# Patient Record
Sex: FEMALE | Race: WHITE | NOT HISPANIC OR LATINO | Employment: STUDENT | URBAN - METROPOLITAN AREA
[De-identification: names, ages, dates, MRNs, and addresses within clinical notes are randomized per-mention and may not be internally consistent; named-entity substitution may affect disease eponyms.]

---

## 2017-01-26 ENCOUNTER — ALLSCRIPTS OFFICE VISIT (OUTPATIENT)
Dept: OTHER | Facility: OTHER | Age: 27
End: 2017-01-26

## 2017-01-26 DIAGNOSIS — K58.9 IRRITABLE BOWEL SYNDROME WITHOUT DIARRHEA: ICD-10-CM

## 2017-04-20 ENCOUNTER — ALLSCRIPTS OFFICE VISIT (OUTPATIENT)
Dept: OTHER | Facility: OTHER | Age: 27
End: 2017-04-20

## 2017-05-01 ENCOUNTER — TRANSCRIBE ORDERS (OUTPATIENT)
Dept: LAB | Facility: CLINIC | Age: 27
End: 2017-05-01

## 2017-05-01 ENCOUNTER — APPOINTMENT (OUTPATIENT)
Dept: LAB | Facility: CLINIC | Age: 27
End: 2017-05-01
Payer: COMMERCIAL

## 2017-05-01 DIAGNOSIS — K58.9 IRRITABLE BOWEL SYNDROME WITHOUT DIARRHEA: ICD-10-CM

## 2017-05-01 PROCEDURE — 87338 HPYLORI STOOL AG IA: CPT

## 2017-05-02 ENCOUNTER — GENERIC CONVERSION - ENCOUNTER (OUTPATIENT)
Dept: OTHER | Facility: OTHER | Age: 27
End: 2017-05-02

## 2017-05-02 LAB — H PYLORI AG STL QL IA: NEGATIVE

## 2017-06-08 ENCOUNTER — GENERIC CONVERSION - ENCOUNTER (OUTPATIENT)
Dept: OTHER | Facility: OTHER | Age: 27
End: 2017-06-08

## 2017-06-09 ENCOUNTER — ALLSCRIPTS OFFICE VISIT (OUTPATIENT)
Dept: OTHER | Facility: OTHER | Age: 27
End: 2017-06-09

## 2017-09-18 ENCOUNTER — HOSPITAL ENCOUNTER (OUTPATIENT)
Dept: GASTROENTEROLOGY | Facility: HOSPITAL | Age: 27
Discharge: HOME | End: 2017-09-18
Payer: COMMERCIAL

## 2017-09-18 VITALS
WEIGHT: 100 LBS | BODY MASS INDEX: 19.63 KG/M2 | DIASTOLIC BLOOD PRESSURE: 72 MMHG | HEART RATE: 85 BPM | HEIGHT: 60 IN | SYSTOLIC BLOOD PRESSURE: 118 MMHG | OXYGEN SATURATION: 100 % | RESPIRATION RATE: 16 BRPM | TEMPERATURE: 98.2 F

## 2017-09-18 PROCEDURE — 91122 HB ANAL PRESSURE RECORD: CPT

## 2017-10-22 ENCOUNTER — LAB CONVERSION - ENCOUNTER (OUTPATIENT)
Dept: OTHER | Facility: OTHER | Age: 27
End: 2017-10-22

## 2017-10-22 LAB
CHOLEST SERPL-MCNC: 221 MG/DL
CHOLEST/HDLC SERPL: 3.5 (CALC)
HDLC SERPL-MCNC: 64 MG/DL
LDL CHOLESTEROL (HISTORICAL): 135 MG/DL (CALC)
NON-HDL-CHOL (CHOL-HDL) (HISTORICAL): 157 MG/DL (CALC)
TRIGL SERPL-MCNC: 110 MG/DL

## 2017-11-27 ENCOUNTER — GENERIC CONVERSION - ENCOUNTER (OUTPATIENT)
Dept: OTHER | Facility: OTHER | Age: 27
End: 2017-11-27

## 2018-01-09 NOTE — RESULT NOTES
Verified Results  (1) Keo Rico, STOOL [id number] 11:15AM Eloy Seed Order Number: [id number]     Test Name Result Flag Reference   H PYLORI ANTIGEN Negative  Negative   Performed at:  [address]  [id number]  : Kathleen Davis MD, Phone:  [phone]

## 2018-01-11 NOTE — PROGRESS NOTES
Assessment    1  Irritable bowel syndrome (564 1) (K58 9)    Plan  Irritable bowel syndrome    · Gastroenterology Referral Other Physician Referral  Consult  Status: Active  Requested  for: [id number]  are Referring to a non- Preferred Provider : Provider not listed in [employer]  Care Summary provided  : Yes    Discussion/Summary    #1  Irritable bowel syndrome- uncontrolled on current regimen with mucus discharge from the rectum  Refer to GI for further evaluation  Patient is living in [place] and will seek out a GI specialist in her area  General referral to gastroenterologist was given  Continue Bentyl until seen  Chief Complaint  c/o issues with IBS  Pt  reports having "anal drainage"  kck      History of Present Illness  HPI: Patient here today because she is having difficulty managing her irritable bowel syndrome-she states she has been having more mucus the discharge from her rectal area similar to a vaginal discharge  She states that she's been having this in her underwear and when she thinks she has gas she is actually passing this mucus and she also has woke up the next morning after sleeping with her boyfriend finding this discharge in the sheets as well and she is very embarrassed by this and wants to see what she can do to stop it  She is taking Bentyl on a regular basis overall does seem to calm down and control her major aerobic bowel syndrome symptoms such as a cramping in the diarrhea  Sometimes she says she needs to stopped taking it because she becomes constipated  She does notice that her symptoms of mucousy discharge and sometimes blood to increase after drinking alcohol  She is overall concerned about anything that she takes affecting the strength for birth control because she does not want to get pregnant  She states that she always uses a condom  She's never seen a gastroenterologist and has been [place] at this time has 2 jobs and is going to [employer]   No family history of any colon issues alternate colitis or Crohn's disease within her family  Last year we did test her for celiac and other blood work which was all normal       Active Problems    1  Acute reaction to stress (308 9) (F43 0)   2  Allergic rhinitis (477 9) (J30 9)   3  Asthma (493 90) (J45 909)   4  Bronchitis (490) (J40)   5  Contraceptives (V25 02)   6  Cough (786 2) (R05)   7  Fatigue (780 79) (R53 83)   8  Human papilloma virus infection (079 4) (B97 7)   9  Hypertriglyceridemia (272 1) (E78 1)   10  Irritable bowel syndrome (564 1) (K58 9)   11  Reactive airway disease (493 90) (J45 909)   12  Sinusitis (473 9) (J32 9)    Family History    1  Family history of Osteopenia    2  Family history of Atrial Fibrillation    3  Family history of Depression    4  Family history of Alzheimer Disease    5  Family history of Malignant Melanoma Of The Skin (V16 8)    Social History    · Being A Social Drinker   · Never A Smoker    Surgical History    1  Contraceptives (V25 02)    Current Meds   1  Dicyclomine HCl - 10 MG Oral Capsule; Patient to take one tablet with each meal and   another before bed as needed for her symptoms of diarrhea and cramping; Therapy: 21NLV7500 to (Last Rx:09Sep2015)  Requested for: 09Sep2015 Ordered   2  Lo Loestrin Fe 1 MG-10 MCG / 10 MCG Oral Tablet; Therapy: 58CPH9380 to (Evaluate:08Cus2096) Recorded    Allergies    1  No Known Drug Allergies    Vitals   Recorded: 89VAD0328 03:21PM Recorded: 88YKM5482 03:17PM   Heart Rate 60    Systolic 809, LUE, Sitting    Diastolic 60, LUE, Sitting    Height  4 ft 11 5 in   Weight  104 lb 4 00 oz   BMI Calculated  20 7   BSA Calculated  1 41     Physical Exam    Constitutional   General appearance: No acute distress, well appearing and well nourished  Eyes   Conjunctiva and lids: No swelling, erythema or discharge      Ears, Nose, Mouth, and Throat   External inspection of ears and nose: Normal     Pulmonary   Respiratory effort: No increased work of breathing or signs of respiratory distress  Auscultation of lungs: Clear to auscultation  Cardiovascular   Auscultation of heart: Normal rate and rhythm, normal S1 and S2, without murmurs  Abdomen   Abdomen: Non-tender, no masses  Liver and spleen: No hepatomegaly or splenomegaly      Musculoskeletal   Gait and station: Normal     Psychiatric   Mood and affect: Normal          Signatures   Electronically signed by : Angelica Isaac [facility]; Jan 19 2016  4:49PM EST                       (Author)    Electronically signed by : JESI Flores ; Jan 19 2016  8:08PM EST

## 2018-01-11 NOTE — PROGRESS NOTES
Assessment    1  Never a smoker   2  Encounter for preventive health examination (V70 0) (Z00 00)    Discussion/Summary    #1  Gen  physical-form completed  Pt had PPD in recent past but does not have date or results with her  PPD not repeated  #2  L foot onychomycosis-patient encouraged to change socks and shoes after running and rotate between more than one    Running shoes  If nails do not improve I would suggest seeing podiatry  Chief Complaint  Pt  here for annual physical exam  Pt  has forms to be completed for employment  c/o "toe infection" on left foot  kck      History of Present Illness  HM, Adult Female: The patient is being seen for a health maintenance evaluation  General Health:   Lifestyle:  She does not have a healthy diet  She does not have any weight concerns  She exercises regularly  She does not use tobacco  She denies drug use  Reproductive health: the patient is premenopausal    Screening:   HPI: Patient here today for general physical  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 and now they want higher her for the remainder of the school year and so a form as needed  Form does require PPD however patient states she has recently had one but does not have the date with results and will gather this information to submit with the form  She is complaining of thickening and breaking of her toenails in the left side  She does run on a regular basis and did get her shoes from the running in 4  Review of Systems    Constitutional: No fever, no chills, feels well, no tiredness, no recent weight gain or weight loss  Eyes: No complaints of eye pain, no red eyes, no eyesight problems, no discharge, no dry eyes, no itching of eyes  ENT: no complaints of earache, no loss of hearing, no nose bleeds, no nasal discharge, no sore throat, no hoarseness     Cardiovascular: No complaints of slow heart rate, no fast heart rate, no chest pain, no palpitations, no leg claudication, no lower extremity edema  Respiratory: No complaints of shortness of breath, no wheezing, no cough, no SOB on exertion, no orthopnea, no PND  Gastrointestinal: No complaints of abdominal pain, no constipation, no nausea or vomiting, no diarrhea, no bloody stools  Genitourinary: No complaints of dysuria, no incontinence, no pelvic pain, no dysmenorrhea, no vaginal discharge or bleeding  Musculoskeletal: No complaints of arthralgias, no myalgias, no joint swelling or stiffness, no limb pain or swelling  Integumentary: as noted in HPI  Neurological: No complaints of headache, no confusion, no convulsions, no numbness, no dizziness or fainting, no tingling, no limb weakness, no difficulty walking  Psychiatric: Not suicidal, no sleep disturbance, no anxiety or depression, no change in personality, no emotional problems  Endocrine: No complaints of proptosis, no hot flashes, no muscle weakness, no deepening of the voice, no feelings of weakness  Hematologic/Lymphatic: No complaints of swollen glands, no swollen glands in the neck, does not bleed easily, does not bruise easily  ROS reviewed  Active Problems    1  Acute reaction to stress (308 9) (F43 0)   2  Allergic rhinitis (477 9) (J30 9)   3  Asthma (493 90) (J45 909)   4  Bronchitis (490) (J40)   5  Contraceptives (V25 02)   6  Cough (786 2) (R05)   7  Fatigue (780 79) (R53 83)   8  Human papilloma virus infection (079 4) (B97 7)   9  Hyperlipidemia (272 4) (E78 5)   10  Hypertriglyceridemia (272 1) (E78 1)   11  Irritable bowel syndrome (564 1) (K58 9)   12  Reactive airway disease (493 90) (J45 909)   13   Sinusitis (473 9) (J32 9)    Past Medical History    · Denied: History of mental disorder    Surgical History    · Contraceptives (V25 02)    Family History  Mother    · Family history of Osteopenia  Father    · Family history of Atrial Fibrillation  Brother    · Family history of Depression  Maternal Grandmother    · Family history of Alzheimer Disease  Paternal Grandfather    · Family history of Malignant Melanoma Of The Skin (V16 8)  Family History    · No family history of mental disorder    Social History    · Being A Social Drinker   · Never a smoker   · No secondhand smoke exposure (V49 89) (Z78 9)   · Single   · Student    Current Meds   1  Daily Multivitamin TABS; Therapy: (Leo Ferreira) to Recorded   2  Dandelion Root CAPS; Therapy: (Leo Ferreira) to Recorded   3  Digestive Enzymes Oral Tablet; Therapy: (Leo Ferreira) to Recorded   4  Lo Loestrin Fe 1 MG-10 MCG / 10 MCG Oral Tablet; Therapy: 70EVM2195 to (Evaluate:85Cvs3411) Recorded   5  Saccharomyces boulardii 50 MG CAPS; Therapy: (Leo Ferreira) to Recorded   6  Xifaxan 550 MG Oral Tablet; TAKE 1 TABLET 3 times daily; Therapy: 93VJP4473 to (Rafael Castro)  Requested for: 22BPK2649; Last   Rx:26Jan2017 Ordered    Allergies    1  No Known Drug Allergies    Vitals   Recorded: 20Apr2017 08:03AM   Temperature 98 5 F, Tympanic   Heart Rate 68   Respiration 18   Systolic 271, LUE, Sitting   Diastolic 64, LUE, Sitting   Height 4 ft 11 5 in   Weight 104 lb 6 oz   BMI Calculated 20 73   BSA Calculated 1 41     Physical Exam    Constitutional   General appearance: No acute distress, well appearing and well nourished  Head and Face   Head and face: Normal     Eyes   Conjunctiva and lids: No swelling, erythema or discharge  Pupils and irises: Equal, round, reactive to light  Ears, Nose, Mouth, and Throat   External inspection of ears and nose: Normal     Otoscopic examination: Tympanic membranes translucent with normal light reflex  Canals patent without erythema  Hearing: Normal     Nasal mucosa, septum, and turbinates: Normal without edema or erythema  Lips, teeth, and gums: Normal, good dentition  Oropharynx: Normal with no erythema, edema, exudate or lesions  Neck   Neck: Supple, symmetric, trachea midline, no masses  Thyroid: Normal, no thyromegaly  Pulmonary   Respiratory effort: No increased work of breathing or signs of respiratory distress  Auscultation of lungs: Clear to auscultation  Cardiovascular   Auscultation of heart: Normal rate and rhythm, normal S1 and S2, no murmurs  Carotid pulses: 2+ bilaterally  Examination of extremities for edema and/or varicosities: Normal     Abdomen   Abdomen: Non-tender, no masses  Liver and spleen: No hepatomegaly or splenomegaly  Lymphatic   Palpation of lymph nodes in neck: No lymphadenopathy  Palpation of lymph nodes in axillae: No lymphadenopathy  Musculoskeletal   Gait and station: Normal     Digits and nails: Abnormal   L 2-4th toes nails with yellow/white discoloration, thickening and brittelness  Joints, bones, and muscles: Normal     Muscle strength/tone: Normal     Skin   Skin and subcutaneous tissue: Normal without rashes or lesions  Neurologic   Reflexes: 2+ and symmetric  Psychiatric   Judgment and insight: Normal     Mood and affect: Normal        Future Appointments    Date/Time Provider Specialty Site   04/25/2017 10:00 AM Heavenly Pike DO Gastroenterology Adult [address]     Signatures   Electronically signed by : Ana Combs, [facility]; Apr 20 2017  8:31AM EST                       (Author)    Electronically signed by : Rosi Thompson DO;  Apr 20 2017  8:32AM EST                       (Author)

## 2018-01-12 VITALS
HEART RATE: 104 BPM | BODY MASS INDEX: 20.1 KG/M2 | TEMPERATURE: 97.3 F | HEIGHT: 60 IN | SYSTOLIC BLOOD PRESSURE: 104 MMHG | OXYGEN SATURATION: 94 % | WEIGHT: 102.38 LBS | DIASTOLIC BLOOD PRESSURE: 60 MMHG | RESPIRATION RATE: 18 BRPM

## 2018-01-12 NOTE — RESULT NOTES
Message   Pls call the pt  I tried calling however was left to go to voice mail and the box was full and could not leave a message  Her repeat lipid panel is much improved from her last  Her LDL went down to 138 from 188  Her trigs went from 121 to 157  I believe she has time and means to work on her cholesterol in [facility] next 3-4 months and then to repeat without starting meds at this time  Orders were placed  Verified Results  (1) LIPID PANEL FASTING W DIRECT LDL REFLEX [id number] 08:59AM [facility] Order Number: [id number]     Test Name Result Flag Reference   CHOLESTEROL 235 mg/dL H    LDL CHOLESTEROL CALCULATED 138 mg/dL H 0-100   - Patient Instructions: This is a fasting blood test  Water, black tea or black coffee only after 9:00pm the night before test   Drink 2 glasses of water the morning of test     - Patient Instructions: This is a fasting blood test  Water, black tea or black coffee only after 9:00pm the night before test Drink 2 glasses of water the morning of test   Triglyceride:         Normal              <150 mg/dl       Borderline High    150-199 mg/dl       High               200-499 mg/dl       Very High          >499 mg/dl  Cholesterol:         Desirable        <200 mg/dl      Borderline High  200-239 mg/dl      High             >239 mg/dl  HDL Cholesterol:        High    >59 mg/dL      Low     <41 mg/dL  LDL Cholesterol:        Optimal          <100 mg/dl        Near Optimal     100-129 mg/dl        Above Optimal          Borderline High   130-159 mg/dl          High              160-189 mg/dl          Very High        >189 mg/dl  LDL CALCULATED:    This screening LDL is a calculated result  It does not have the accuracy of the Direct Measured LDL in the monitoring of patients with hyperlipidemia and/or statin therapy  Direct Measure LDL (OAD503) must be ordered separately in these patients     TRIGLYCERIDES 157 mg/dL H <=150   Specimen collection should occur prior to N-Acetylcysteine or Metamizole administration due to the potential for falsely depressed results  HDL,DIRECT 66 mg/dL H 40-60   Specimen collection should occur prior to Metamizole administration due to the potential for falsely depressed results  Plan  Hyperlipidemia    · (1) LIPID PANEL FASTING W DIRECT LDL REFLEX; Status:Active;  Requested  for:44Qek9231;     Signatures   Electronically signed by : Caryn Hernandez, [facility]; Nov 28 2016  1:52PM EST                       (Author)

## 2018-01-14 VITALS
BODY MASS INDEX: 20.03 KG/M2 | HEIGHT: 60 IN | WEIGHT: 102 LBS | TEMPERATURE: 97.1 F | OXYGEN SATURATION: 98 % | HEART RATE: 62 BPM | SYSTOLIC BLOOD PRESSURE: 102 MMHG | DIASTOLIC BLOOD PRESSURE: 60 MMHG

## 2018-01-15 VITALS
WEIGHT: 104.38 LBS | HEART RATE: 68 BPM | RESPIRATION RATE: 18 BRPM | BODY MASS INDEX: 20.49 KG/M2 | DIASTOLIC BLOOD PRESSURE: 64 MMHG | HEIGHT: 60 IN | TEMPERATURE: 98.5 F | SYSTOLIC BLOOD PRESSURE: 100 MMHG

## 2018-01-17 NOTE — MISCELLANEOUS
Provider Comments  Provider Comments:   Dear Dayanna Mena,    You missed your appointment with Dr Homa Gil on 11/27/2017; please contact our office to reschedule at [phone]  We understand that many situations arise that occasionally prevents patients from keeping scheduled appointments  It is the policy of [address] [place] [address] [facility] that patients notify us 24 hours in advance if unable to keep a scheduled appointment  Missed appointments jeopardize strong physician-patient relationships  The appointment you missed could have easily been made available to another patient if you had contacted us to cancel  We like to accommodate all of our patients, but when patients miss an appointment it prevents us from being able to help everyone  In the future, we request at least 24 hours' notice of cancellation so we can make your appointment available to someone else in need       Sincerely,    The Physicians and Staff of [facility]        Signatures   Electronically signed by : Mehrdad Sanchez, ; Nov 27 2017  2:30PM EST                       (Author)